# Patient Record
Sex: FEMALE | Race: WHITE
[De-identification: names, ages, dates, MRNs, and addresses within clinical notes are randomized per-mention and may not be internally consistent; named-entity substitution may affect disease eponyms.]

---

## 2015-07-04 VITALS
SYSTOLIC BLOOD PRESSURE: 150 MMHG | DIASTOLIC BLOOD PRESSURE: 96 MMHG | SYSTOLIC BLOOD PRESSURE: 150 MMHG | SYSTOLIC BLOOD PRESSURE: 150 MMHG | DIASTOLIC BLOOD PRESSURE: 96 MMHG | DIASTOLIC BLOOD PRESSURE: 96 MMHG

## 2018-01-09 ENCOUNTER — HOSPITAL ENCOUNTER (OUTPATIENT)
Dept: HOSPITAL 63 - US | Age: 53
Discharge: HOME | End: 2018-01-09
Attending: FAMILY MEDICINE
Payer: MEDICARE

## 2018-01-09 DIAGNOSIS — Z85.72: ICD-10-CM

## 2018-01-09 DIAGNOSIS — I10: ICD-10-CM

## 2018-01-09 DIAGNOSIS — Z87.891: ICD-10-CM

## 2018-01-09 DIAGNOSIS — F90.9: Primary | ICD-10-CM

## 2018-01-09 PROCEDURE — 76536 US EXAM OF HEAD AND NECK: CPT

## 2018-01-10 NOTE — RAD
Indication: Palpable abnormality in the right parotid region. First noticed in

October. History of lymphoma diagnosed in 2014.



Technique: Targeted ultrasound of the area of concern was performed.



Findings: Few probable intraparotid lymph nodes are noted along with

additional adjacent lymph nodes. All of these lymph nodes have normal fatty

notches and none demonstrate a thickened cortex. Largest measures 19 x 7 mm.



Impression: Benign-appearing lymph nodes at the area of clinical concern.

Clinical follow-up to resolution would be suggested. Given history of

lymphoma, if further workup is required or if lymph nodes do not clinically

resolve, PET/CT could be considered.

## 2018-01-15 ENCOUNTER — HOSPITAL ENCOUNTER (OUTPATIENT)
Dept: HOSPITAL 63 - CT | Age: 53
Discharge: HOME | End: 2018-01-15
Attending: FAMILY MEDICINE
Payer: MEDICARE

## 2018-01-15 DIAGNOSIS — Z87.891: ICD-10-CM

## 2018-01-15 DIAGNOSIS — Z85.72: ICD-10-CM

## 2018-01-15 DIAGNOSIS — Z12.89: Primary | ICD-10-CM

## 2018-01-15 DIAGNOSIS — R91.1: ICD-10-CM

## 2018-01-15 DIAGNOSIS — K76.0: ICD-10-CM

## 2018-01-15 PROCEDURE — 74177 CT ABD & PELVIS W/CONTRAST: CPT

## 2018-01-15 PROCEDURE — 70491 CT SOFT TISSUE NECK W/DYE: CPT

## 2018-01-15 PROCEDURE — 71260 CT THORAX DX C+: CPT

## 2018-01-15 NOTE — RAD
CT neck, chest, abdomen and pelvis with contrast 1/15/2018



Clinical indication: Cervical lymphadenopathy and history of lymphoma.



Comparison: Ultrasound neck 1/9/2018, CT neck, chest, abdomen and pelvis

5/17/2016.



Technique: Multiple CT images of the neck, chest, abdomen and pelvis are

obtained following intravenous administration of 75 mL Omnipaque 300.



PQRS Compliance Statement:



One or more of the following individualized dose reduction techniques were

utilized for this examination:

1. Automated exposure control

2. Adjustment of the mA and/or kV according to patient size

3. Use of iterative reconstruction technique



Findings:

Neck: 



Only the inferior half of the parotid glands are included in the field-of-view

with stable mildly enlarged right intraparotid lymph node measuring 1.2 cm

series 6/image 2, previously 1.2 cm when measured in a similar fashion.  There

are additional mildly prominent cervical lymph nodes vascular change from

prior examination.



The submandibular and thyroid glands are normal in appearance.  



There are no destructive osseous lesions.



Chest:



Heart size is normal thousand and pericardial effusion.  The thoracic aorta is

normal in caliber.



No axillary or hilar lymphadenopathy.  Interval development of a mildly

enlarged AP window lymph node measuring 1.2 cm short axis series 5/image 31,

previously 0.8 cm.  Stable mildly prominent subcarinal lymph node measuring

0.9 cm series 5/image 44.



The central airways are patent.  There is mild mosaic attenuation.  There is a

stable 0.3 cm noncalcified pulmonary nodule in the right lower lobe series

5/image 58, consider benign.  No new or enlarging pulmonary nodules.  No

pleural effusion or pneumothorax.



There are no destructive osseous lesions.



Abdomen and pelvis:

There is diffuse hepatic steatosis.  Gallbladder, spleen, adrenal glands,

pancreas and kidneys are unremarkable.



Abdominal aorta is normal caliber with moderate aortoiliac calcified

atheromatous disease.



No retroperitoneal or mesenteric lymphadenopathy.



No abdominal free fluid.  Small and large bowel loops are normal in caliber

without obstruction.  Appendix is normal appearance.



Mildly distended and unopacified urinary bladder unremarkable.  Uterus is

atrophic or absent.  No iliac or inguinal lymphadenopathy.



Urinary bladder is unremarkable.



There is a right flank subcutaneous generator with spinal stimulator lead. 

There are no destructive osseous lesions.



Impression:

1.  Stable mildly enlarged right intraparotid lymph node and progression in a

mildly enlarged mediastinal lymph node.  Given patient's history, findings may

represent lymphomatous involvement, however reactive lymph nodes are

additional possibility.  Short-term follow-up or PET CT is recommended for

further evaluation.

2.  Pulmonary mosaic attenuation, may represent small airways disease.

## 2018-02-08 ENCOUNTER — HOSPITAL ENCOUNTER (OUTPATIENT)
Dept: HOSPITAL 61 - PETSC | Age: 53
Discharge: HOME | End: 2018-02-08
Payer: MEDICARE

## 2018-02-08 DIAGNOSIS — C85.90: Primary | ICD-10-CM

## 2018-02-08 DIAGNOSIS — Z85.72: ICD-10-CM

## 2018-02-08 PROCEDURE — 78815 PET IMAGE W/CT SKULL-THIGH: CPT

## 2018-02-20 ENCOUNTER — HOSPITAL ENCOUNTER (OUTPATIENT)
Dept: HOSPITAL 61 - INTRAD | Age: 53
Discharge: HOME | End: 2018-02-20
Payer: MEDICARE

## 2018-02-20 VITALS
SYSTOLIC BLOOD PRESSURE: 136 MMHG | DIASTOLIC BLOOD PRESSURE: 75 MMHG | DIASTOLIC BLOOD PRESSURE: 75 MMHG | SYSTOLIC BLOOD PRESSURE: 136 MMHG

## 2018-02-20 DIAGNOSIS — J44.9: ICD-10-CM

## 2018-02-20 DIAGNOSIS — F41.9: ICD-10-CM

## 2018-02-20 DIAGNOSIS — E66.9: ICD-10-CM

## 2018-02-20 DIAGNOSIS — K11.9: Primary | ICD-10-CM

## 2018-02-20 DIAGNOSIS — Z79.01: ICD-10-CM

## 2018-02-20 DIAGNOSIS — Z87.39: ICD-10-CM

## 2018-02-20 DIAGNOSIS — I10: ICD-10-CM

## 2018-02-20 DIAGNOSIS — E11.9: ICD-10-CM

## 2018-02-20 DIAGNOSIS — Z72.0: ICD-10-CM

## 2018-02-20 DIAGNOSIS — Z90.710: ICD-10-CM

## 2018-02-20 DIAGNOSIS — F32.9: ICD-10-CM

## 2018-02-20 DIAGNOSIS — E78.00: ICD-10-CM

## 2018-02-20 LAB
ADD MAN DIFF?: NO
BASO #: 0.1 X10^3/UL (ref 0–0.2)
BASO %: 1 % (ref 0–3)
EOS #: 0.3 X10^3/UL (ref 0–0.7)
EOS %: 2 % (ref 0–3)
HCG SERPL-ACNC: 12.7 X10^3/UL (ref 4–11)
HEMATOCRIT: 42.9 % (ref 36–47)
HEMOGLOBIN: 14.4 G/DL (ref 12–15.5)
INR: 1 (ref 0.8–1.1)
LYMPH #: 3.8 X10^3/UL (ref 1–4.8)
LYMPH %: 30 % (ref 24–48)
MEAN CORPUSCULAR HEMOGLOBIN: 31 PG (ref 25–35)
MEAN CORPUSCULAR HGB CONC: 33 G/DL (ref 31–37)
MEAN CORPUSCULAR VOLUME: 92 FL (ref 79–100)
MONO #: 0.6 X10^3/UL (ref 0–1.1)
MONO %: 5 % (ref 0–9)
NEUT #: 7.9 X10^3UL (ref 1.8–7.7)
NEUT %: 63 % (ref 31–73)
PARTIAL THROMBOPLASTIN TIME: 33 SEC (ref 24–38)
PLATELET COUNT: 213 X10^3/UL (ref 140–400)
PROTHROMBIN TIME PATIENT: 12.5 SEC (ref 11.7–14)
RED BLOOD COUNT: 4.65 X10^6/UL (ref 3.5–5.4)
RED CELL DISTRIBUTION WIDTH: 15.2 % (ref 11.5–14.5)

## 2018-02-20 PROCEDURE — 42400 BIOPSY OF SALIVARY GLAND: CPT

## 2018-02-20 PROCEDURE — 99152 MOD SED SAME PHYS/QHP 5/>YRS: CPT

## 2018-02-20 PROCEDURE — 85610 PROTHROMBIN TIME: CPT

## 2018-02-20 PROCEDURE — 85025 COMPLETE CBC W/AUTO DIFF WBC: CPT

## 2018-02-20 PROCEDURE — 76942 ECHO GUIDE FOR BIOPSY: CPT

## 2018-02-20 PROCEDURE — 85730 THROMBOPLASTIN TIME PARTIAL: CPT

## 2018-02-20 PROCEDURE — 88305 TISSUE EXAM BY PATHOLOGIST: CPT

## 2018-02-20 PROCEDURE — 88185 FLOWCYTOMETRY/TC ADD-ON: CPT

## 2018-02-20 PROCEDURE — 36415 COLL VENOUS BLD VENIPUNCTURE: CPT

## 2018-02-20 PROCEDURE — 88184 FLOWCYTOMETRY/ TC 1 MARKER: CPT

## 2018-02-20 RX ADMIN — FENTANYL CITRATE 1 MCG: 50 INJECTION INTRAMUSCULAR; INTRAVENOUS at 10:15

## 2018-02-20 RX ADMIN — MIDAZOLAM HYDROCHLORIDE 1 MG: 1 INJECTION, SOLUTION INTRAMUSCULAR; INTRAVENOUS at 10:15

## 2018-02-20 RX ADMIN — LIDOCAINE HYDROCHLORIDE 1 ML: 10 INJECTION, SOLUTION INFILTRATION; PERINEURAL at 10:15

## 2018-06-18 ENCOUNTER — HOSPITAL ENCOUNTER (OUTPATIENT)
Dept: HOSPITAL 63 - CT | Age: 53
Discharge: HOME | End: 2018-06-18
Attending: INTERNAL MEDICINE
Payer: MEDICARE

## 2018-06-18 DIAGNOSIS — K76.0: ICD-10-CM

## 2018-06-18 DIAGNOSIS — C83.89: Primary | ICD-10-CM

## 2018-06-18 PROCEDURE — 72132 CT LUMBAR SPINE W/DYE: CPT

## 2018-06-18 PROCEDURE — 71260 CT THORAX DX C+: CPT

## 2018-06-18 NOTE — RAD
CT study of the neck and chest with contrast

 

Clinical indications: Lymphoma. Follow-up study.

 

COMPARISON: January 15, 2018 neck and chest CT. Neck and chest CT dated 

May 17, 2016.

 

TECHNIQUE: After IV infusion of 75 cc of Omnipaque 300, helical CT 

scanning of the neck and chest was performed.

 

One or more of the following individual dose reduction techniques were 

utilized for this study:

1. Automated exposure control

2. Adjustment of the mA and/or kV according to patient size

3. Use of iterative reconstruction technique

 

CT STUDY OF THE NECK: Again seen is a nodular area within the deep lobe of

the right parotid gland. It is difficult to separate it out from adjacent 

enhancing venous branches. Overall, this has not changed significantly in 

size or appearance from the previous study. In addition, there is an 

enhancing plaque-like area along the superficial edge of the anterior 

portion of the superficial lobe of the right parotid gland. This is 

unchanged as well. The left parotid gland is unremarkable. No new cervical

lymphadenopathy is evident. The submandibular salivary glands are 

unremarkable. The thyroid gland is unremarkable. The epiglottis and 

aryepiglottic folds and true cords and false cords are unremarkable. The 

palatine tonsils are symmetric without significant enlargement. The 

adenoids are not abnormally enlarged enlarged. No soft tissue mass or 

abscess is seen.

 

IMPRESSION: Stable intraparotid gland nodule or lymph node and stable 

plaque-like enhancement of the right parotid gland. This is stable over 2 

years. No new cervical lymphadenopathy is seen.

 

CHEST CT: Again seen is a AP window lymph node which is unchanged in size.

The mediastinal lymph nodes are unchanged. No new thoracic lymphadenopathy

is seen. No focal aneurysmal dilatation or dissection of the thoracic 

aorta is seen. The heart size is normal and no pericardial effusion is 

seen. Again seen is a right lower lobe lung nodule laterally which is 

stable. This is stable over 2 years consistent with a benign finding. No 

new lung nodules are seen. There is a new lung infiltrate within the 

inferior segment of the lingula. Groundglass lung infiltrates seen 

previously have not progressed. No pleural effusion or pneumothorax is 

seen. The proximal bronchial tree is patent. No osteolytic process is 

seen. No adrenal mass is evident. Fatty infiltration of the liver is 

evident. No osteolytic process is seen.

 

IMPRESSION: Stable mediastinal lymph nodes.

 

New finding of a small infiltrate within the inferior segment of the 

lingula.

 

Electronically signed by: Nick Thomas MD (6/18/2018 3:27 PM) 

[id number]

## 2018-09-13 ENCOUNTER — HOSPITAL ENCOUNTER (OUTPATIENT)
Dept: HOSPITAL 61 - PETSC | Age: 53
Discharge: HOME | End: 2018-09-13
Payer: MEDICARE

## 2018-09-13 DIAGNOSIS — I10: ICD-10-CM

## 2018-09-13 DIAGNOSIS — K11.8: Primary | ICD-10-CM

## 2018-09-13 DIAGNOSIS — E78.00: ICD-10-CM

## 2018-09-13 DIAGNOSIS — Z85.72: ICD-10-CM

## 2018-09-13 DIAGNOSIS — Z87.39: ICD-10-CM

## 2018-09-13 DIAGNOSIS — E11.9: ICD-10-CM

## 2018-09-13 PROCEDURE — A9552 F18 FDG: HCPCS

## 2018-09-13 PROCEDURE — 78815 PET IMAGE W/CT SKULL-THIGH: CPT

## 2018-09-13 NOTE — RAD
FDG tumor localization scan, PET/CT, 9/13/2018:



History: Restaging lymphoma



Following IV injection of 11.8 mCi of 18 F-FDG, imaging was performed from the

skull base to the proximal thighs.  The noncontrast CT component was performed

for attenuation correction and anatomic localization purposes rather than for

primary diagnosis.  The patient's blood glucose level at the time of injection

was 177 MG/DL.



Comparison is made to a study from 2/8/2018.



Normal physiologic FDG uptake is seen in the neck.  The CT component again

demonstrates a small 10 x 17 mm nodule within the medial aspect of the right

parotid gland.  It appears to have decreased slightly in size.  It

demonstrates only low level FDG uptake with a maximum SUV of 2.5, similar to

that seen on the previous study.  The degree of FDG uptake is similar to that

of the mediastinal background.



No abnormal pulmonary, mediastinal or hilar FDG uptake is seen.  The small 10

mm lymph node seen along the lateral aspect of the AP window on the left is

unchanged in size.  No abnormal FDG uptake relative to the mediastinal

background is currently seen.



Normal GI tract and urinary tract activity is present in the abdomen and

pelvis.  The hepatic uptake is heterogeneous as is often the case.  No

hypermetabolic abdominal or pelvic lesion is seen.



Incidental CT findings include the presence of a spinal stimulator lead

extending into the thoracic spinal canal.  Mild mosaic attenuation of the

lungs is unchanged.





IMPRESSION:

1.  Slight interval decrease in size of the small right parotid nodule,

demonstrating only low level FDG uptake.

2.  No additional FDG PET findings to suggest residual or recurrent lymphoma.

## 2018-10-16 ENCOUNTER — HOSPITAL ENCOUNTER (EMERGENCY)
Dept: HOSPITAL 63 - ER | Age: 53
Discharge: HOME | End: 2018-10-16
Payer: MEDICARE

## 2018-10-16 VITALS
SYSTOLIC BLOOD PRESSURE: 112 MMHG | DIASTOLIC BLOOD PRESSURE: 80 MMHG | SYSTOLIC BLOOD PRESSURE: 112 MMHG | DIASTOLIC BLOOD PRESSURE: 80 MMHG

## 2018-10-16 VITALS — HEIGHT: 65 IN | BODY MASS INDEX: 39.99 KG/M2 | WEIGHT: 240 LBS

## 2018-10-16 DIAGNOSIS — K21.9: ICD-10-CM

## 2018-10-16 DIAGNOSIS — L73.9: Primary | ICD-10-CM

## 2018-10-16 DIAGNOSIS — E11.40: ICD-10-CM

## 2018-10-16 DIAGNOSIS — Z88.8: ICD-10-CM

## 2018-10-16 DIAGNOSIS — I10: ICD-10-CM

## 2018-10-16 DIAGNOSIS — F17.210: ICD-10-CM

## 2018-10-16 DIAGNOSIS — G43.909: ICD-10-CM

## 2018-10-16 PROCEDURE — 99283 EMERGENCY DEPT VISIT LOW MDM: CPT

## 2018-10-16 NOTE — PHYS DOC
Past History


Past Medical History:  Anxiety, Cancer (non-Hodgkin's lymphoma), Diabetes, GERD

, Hypertension, Migraines


Additional Past Medical Histor:  neuropathy


Past Surgical History:  Hysterectomy


Additional Past Surgical Histo:  left knee, neurostimulator, removal of 

abdominal tumor


Smoking:  Cigarettes


Alcohol Use:  None


Drug Use:  None





Adult General


Chief Complaint


Chief Complaint:  SKIN RASH/ABSCESS





HPI


HPI


52-year-old female presents with report of swelling and redness to top of scalp 

which has been progressively worsening over the last 2-3 days. Patient reports 

history of chemotherapy for non-Hodgkin's lymphoma. Patient also has history of 

diabetes. Reports she had to shave her scalp due to hair loss and has had 

several "ingrown hairs ". Reports has had her partner try to remove them. 

Denies fever or chills.





Review of Systems


Review of Systems





Constitutional: Denies fever or chills []


Eyes: Denies change in visual acuity, redness, or eye pain []


HENT: Denies nasal congestion or sore throat []


Respiratory: Denies cough or shortness of breath []


Musculoskeletal: Denies back pain or joint pain []


Integument: Reports redness and swelling to top of scalp


Neurologic: Denies headache, focal weakness or sensory changes []





Complete systems were reviewed and found to be within normal limits, except as 

documented in this note.





Allergies


Allergies





Allergies








Coded Allergies Type Severity Reaction Last Updated Verified


 


  paroxetine HCl Allergy Unknown  9/29/14 Yes











Physical Exam


Physical Exam





Constitutional: Well developed, well nourished, no acute distress, non-toxic 

appearance. []


HENT: Normocephalic, atraumatic


Eyes: Conjunctiva normal, no discharge. [] 


Neck: Normal range of motion, no midline tenderness, supple


Cardiovascular:Heart rate regular rhythm, no murmur []


Lungs & Thorax:  No respiratory distress


Skin: Warm, dry, indurated region approximately 2cm with pustules noted to each 

hair follicle to top of scalp with surrounding erythema consistent for 

folliculitis


Extremities: No tenderness,  ROM intact


Neurologic: Alert and oriented X 3, no focal deficits noted. []


Psychologic: Affect normal, judgement normal, mood normal. []





EKG


EKG


[]





Radiology/Procedures


Radiology/Procedures


[]





Course & Med Decision Making


Course & Med Decision Making


Patient presents with history of present illness and physical exam consistent 

for folliculitis. Patient is immune compromised due to chemotherapy and 

diabetes. Afebrile. Empiric antibiotics initiated. Patient stable for discharge 

with outpatient follow-up with PCP/oncologist. Discussed findings and plan with 

patient and family, who acknowledge understanding and agreement.





Dragon Disclaimer


Dragon Disclaimer


This electronic medical record was generated, in whole or in part, using a 

voice recognition dictation system.





Departure


Departure:


Impression:  


 Primary Impression:  


 Folliculitis


Disposition:  01 HOME, SELF-CARE


Condition:  STABLE


Referrals:  


ISMAEL QUEVEDO DO (PCP)


Patient Instructions:  Folliculitis


Scripts


Mupirocin Calcium (BACTROBAN) 15 Gm Cream..g.


1 DEB TP TID for 10 Days, #30 GM


   Prov: JENNA MORENO DO         10/16/18 


Clindamycin Hcl (CLINDAMYCIN HCL) 300 Mg Capsule


1 CAP PO TID for 10 Days, #30 CAP


   Prov: JENNA MORENO DO         10/16/18











JENNA MORENO DO Oct 16, 2018 23:01

## 2019-11-08 ENCOUNTER — HOSPITAL ENCOUNTER (OUTPATIENT)
Dept: HOSPITAL 61 - PETSC | Age: 54
Discharge: HOME | End: 2019-11-08
Payer: MEDICARE

## 2019-11-08 DIAGNOSIS — C88.4: Primary | ICD-10-CM

## 2019-11-08 PROCEDURE — A9552 F18 FDG: HCPCS

## 2019-11-08 PROCEDURE — 78815 PET IMAGE W/CT SKULL-THIGH: CPT

## 2019-11-08 NOTE — RAD
PET/CT imaging from the skull through the midthigh 



History: Lymphoma. Restaging.



Comparison: September 13, 2018  



Technique: PET examination was performed from the skull base to the proximal

thighs after intravenous administration of 14.8 mCi Fluorine 18 FDG.  A

noncontrast CT scan was performed for the purposes of localization and

attenuation, not for primary diagnosis.  Blood glucose level at time of

injection was 189 mg/dl.  



PQRS Compliance Statement:



One or more of the following individualized dose reduction techniques were

utilized for this examination:

1. Automated exposure control

2. Adjustment of the mA and/or kV according to patient size

3. Use of iterative reconstruction technique







Findings:



Head and neck: Parapharyngeal hypermetabolic activity is seen bilaterally with

a max SUV of 3.7. Physiologic hypermetabolic muscle activity is evident.





CHEST: Left hilar activity is seen with a max SUV 3.2. Right hilar activity is

seen with the max SUV of 2.4. No bulky lymph nodes are seen in this area

otherwise. There is vascular physiologic activity. There is a 2.2 cm lymph

node within the aorta pulmonary window. This demonstrates hypermetabolic

activity max SUV of 2.1. Physiologic hypermetabolic muscle activity is seen.



ABDOMEN AND PELVIS: GI and  physiologic activity is evident. Physiologic

muscle activity is apparent especially the left paraspinous muscle region and

gluteal regions bilaterally.. Spleen is not enlarged. Mild physiologic

activity within the spleen. There is physiologic activity within the liver. No

abnormally enlarged or hypermetabolic abdominal or pelvic lymph nodes are

seen.



MUSCULOSKELETAL: With the exception of the physiologic activity just

described, no other abnormal osseous or metabolic activity is seen.



IMPRESSION

Probable physiologic activity involving the parapharyngeal region bilaterally.

Oral pharyngeal clinical exam is recommended. No abnormal cervical or

abdominal or pelvic lymphadenopathy. There is bilateral hilar hypermetabolic

activity but no bulky lymphadenopathy is seen here. There is a 2.2 cm area

aortic pulmonary window lymph node with mild hypermetabolic activity. This

lymph node measured 1.8 cm previously in 2018.



There are multiple areas of physiologic muscle activity throughout especially

the left paraspinous region and the gluteal regions bilaterally. This may be

due to involuntary muscle movements or spasm or shivering. This could be

secondary or myositis if there are clinical findings of such.

## 2021-08-26 ENCOUNTER — HOSPITAL ENCOUNTER (OUTPATIENT)
Dept: HOSPITAL 63 - OPINF | Age: 56
Discharge: HOME | End: 2021-08-26
Attending: FAMILY MEDICINE
Payer: MEDICARE

## 2021-08-26 DIAGNOSIS — I10: ICD-10-CM

## 2021-08-26 DIAGNOSIS — Z85.72: ICD-10-CM

## 2021-08-26 DIAGNOSIS — G43.909: ICD-10-CM

## 2021-08-26 DIAGNOSIS — K21.9: ICD-10-CM

## 2021-08-26 DIAGNOSIS — E11.40: ICD-10-CM

## 2021-08-26 DIAGNOSIS — Z87.891: ICD-10-CM

## 2021-08-26 DIAGNOSIS — U07.1: Primary | ICD-10-CM

## 2021-08-26 PROCEDURE — M0243 CASIRIVI AND IMDEVI INFUSION: HCPCS

## 2021-08-26 NOTE — NUR
pt arrived for out pt infusion per orders and policies and procedures. pt monitored and 
vitals checked 1 hour after infusion complete with no reaction noted.